# Patient Record
Sex: FEMALE | Race: AMERICAN INDIAN OR ALASKA NATIVE | NOT HISPANIC OR LATINO | Employment: OTHER | URBAN - METROPOLITAN AREA
[De-identification: names, ages, dates, MRNs, and addresses within clinical notes are randomized per-mention and may not be internally consistent; named-entity substitution may affect disease eponyms.]

---

## 2018-07-20 ENCOUNTER — HOSPITAL ENCOUNTER (OUTPATIENT)
Facility: MEDICAL CENTER | Age: 63
End: 2018-07-20
Attending: SURGERY | Admitting: SURGERY
Payer: MEDICARE

## 2018-07-20 ENCOUNTER — APPOINTMENT (OUTPATIENT)
Dept: RADIOLOGY | Facility: MEDICAL CENTER | Age: 63
End: 2018-07-20
Attending: SURGERY
Payer: MEDICARE

## 2018-07-20 VITALS
DIASTOLIC BLOOD PRESSURE: 41 MMHG | BODY MASS INDEX: 37.9 KG/M2 | OXYGEN SATURATION: 100 % | SYSTOLIC BLOOD PRESSURE: 95 MMHG | WEIGHT: 222 LBS | HEART RATE: 66 BPM | TEMPERATURE: 98.2 F | RESPIRATION RATE: 19 BRPM | HEIGHT: 64 IN

## 2018-07-20 DIAGNOSIS — T82.868A THROMBOSIS OF KIDNEY DIALYSIS ARTERIOVENOUS GRAFT, INITIAL ENCOUNTER (HCC): ICD-10-CM

## 2018-07-20 LAB
ANION GAP SERPL CALC-SCNC: 11 MMOL/L (ref 0–11.9)
BUN SERPL-MCNC: 47 MG/DL (ref 8–22)
CALCIUM SERPL-MCNC: 9.7 MG/DL (ref 8.5–10.5)
CHLORIDE SERPL-SCNC: 100 MMOL/L (ref 96–112)
CO2 SERPL-SCNC: 24 MMOL/L (ref 20–33)
CREAT SERPL-MCNC: 8.89 MG/DL (ref 0.5–1.4)
EKG IMPRESSION: NORMAL
ERYTHROCYTE [DISTWIDTH] IN BLOOD BY AUTOMATED COUNT: 48.8 FL (ref 35.9–50)
GLUCOSE BLD-MCNC: 122 MG/DL (ref 65–99)
GLUCOSE SERPL-MCNC: 151 MG/DL (ref 65–99)
HCT VFR BLD AUTO: 43.4 % (ref 37–47)
HGB BLD-MCNC: 13.9 G/DL (ref 12–16)
MCH RBC QN AUTO: 30.3 PG (ref 27–33)
MCHC RBC AUTO-ENTMCNC: 32 G/DL (ref 33.6–35)
MCV RBC AUTO: 94.8 FL (ref 81.4–97.8)
PLATELET # BLD AUTO: 228 K/UL (ref 164–446)
PMV BLD AUTO: 9.9 FL (ref 9–12.9)
POTASSIUM SERPL-SCNC: 5.7 MMOL/L (ref 3.6–5.5)
RBC # BLD AUTO: 4.58 M/UL (ref 4.2–5.4)
SODIUM SERPL-SCNC: 135 MMOL/L (ref 135–145)
WBC # BLD AUTO: 6.9 K/UL (ref 4.8–10.8)

## 2018-07-20 PROCEDURE — 85027 COMPLETE CBC AUTOMATED: CPT

## 2018-07-20 PROCEDURE — 36904 THRMBC/NFS DIALYSIS CIRCUIT: CPT

## 2018-07-20 PROCEDURE — 700101 HCHG RX REV CODE 250

## 2018-07-20 PROCEDURE — 93005 ELECTROCARDIOGRAM TRACING: CPT | Performed by: SURGERY

## 2018-07-20 PROCEDURE — 700111 HCHG RX REV CODE 636 W/ 250 OVERRIDE (IP)

## 2018-07-20 PROCEDURE — 99153 MOD SED SAME PHYS/QHP EA: CPT

## 2018-07-20 PROCEDURE — 93010 ELECTROCARDIOGRAM REPORT: CPT | Performed by: INTERNAL MEDICINE

## 2018-07-20 PROCEDURE — 82962 GLUCOSE BLOOD TEST: CPT

## 2018-07-20 PROCEDURE — 160002 HCHG RECOVERY MINUTES (STAT)

## 2018-07-20 PROCEDURE — 80048 BASIC METABOLIC PNL TOTAL CA: CPT

## 2018-07-20 RX ORDER — MIDAZOLAM HYDROCHLORIDE 1 MG/ML
INJECTION INTRAMUSCULAR; INTRAVENOUS
Status: COMPLETED
Start: 2018-07-20 | End: 2018-07-20

## 2018-07-20 RX ORDER — ONDANSETRON 2 MG/ML
INJECTION INTRAMUSCULAR; INTRAVENOUS
Status: COMPLETED
Start: 2018-07-20 | End: 2018-07-20

## 2018-07-20 RX ORDER — IODIXANOL 270 MG/ML
100 INJECTION, SOLUTION INTRAVASCULAR ONCE
Status: COMPLETED | OUTPATIENT
Start: 2018-07-20 | End: 2018-07-20

## 2018-07-20 RX ORDER — ONDANSETRON 2 MG/ML
4 INJECTION INTRAMUSCULAR; INTRAVENOUS PRN
Status: DISCONTINUED | OUTPATIENT
Start: 2018-07-20 | End: 2018-07-20 | Stop reason: HOSPADM

## 2018-07-20 RX ORDER — SODIUM CHLORIDE 9 MG/ML
500 INJECTION, SOLUTION INTRAVENOUS
Status: DISCONTINUED | OUTPATIENT
Start: 2018-07-20 | End: 2018-07-20 | Stop reason: HOSPADM

## 2018-07-20 RX ORDER — ALBUTEROL SULFATE 90 UG/1
2 AEROSOL, METERED RESPIRATORY (INHALATION) EVERY 6 HOURS PRN
COMMUNITY

## 2018-07-20 RX ORDER — LIDOCAINE HYDROCHLORIDE 10 MG/ML
0.5 INJECTION, SOLUTION INFILTRATION; PERINEURAL
Status: DISCONTINUED | OUTPATIENT
Start: 2018-07-20 | End: 2018-07-21 | Stop reason: HOSPADM

## 2018-07-20 RX ORDER — SEVELAMER CARBONATE 800 MG/1
2400 TABLET, FILM COATED ORAL
COMMUNITY

## 2018-07-20 RX ORDER — SODIUM CHLORIDE 9 MG/ML
INJECTION, SOLUTION INTRAVENOUS CONTINUOUS
Status: DISCONTINUED | OUTPATIENT
Start: 2018-07-20 | End: 2018-07-21 | Stop reason: HOSPADM

## 2018-07-20 RX ORDER — MIDAZOLAM HYDROCHLORIDE 1 MG/ML
.5-2 INJECTION INTRAMUSCULAR; INTRAVENOUS PRN
Status: DISCONTINUED | OUTPATIENT
Start: 2018-07-20 | End: 2018-07-20 | Stop reason: HOSPADM

## 2018-07-20 RX ORDER — LEVOTHYROXINE SODIUM 0.05 MG/1
50 TABLET ORAL
COMMUNITY

## 2018-07-20 RX ORDER — LIDOCAINE HYDROCHLORIDE 10 MG/ML
INJECTION, SOLUTION INFILTRATION; PERINEURAL
Status: COMPLETED
Start: 2018-07-20 | End: 2018-07-20

## 2018-07-20 RX ADMIN — IODIXANOL 47 ML: 270 INJECTION, SOLUTION INTRAVASCULAR at 20:20

## 2018-07-20 RX ADMIN — MIDAZOLAM HYDROCHLORIDE 2 MG: 1 INJECTION INTRAMUSCULAR; INTRAVENOUS at 20:13

## 2018-07-20 RX ADMIN — FENTANYL CITRATE 50 MCG: 50 INJECTION, SOLUTION INTRAMUSCULAR; INTRAVENOUS at 19:50

## 2018-07-20 RX ADMIN — SODIUM CHLORIDE: 9 INJECTION, SOLUTION INTRAVENOUS at 13:58

## 2018-07-20 RX ADMIN — LIDOCAINE HYDROCHLORIDE 0.5 ML: 10 INJECTION, SOLUTION INFILTRATION; PERINEURAL at 13:58

## 2018-07-20 RX ADMIN — MIDAZOLAM 2 MG: 1 INJECTION INTRAMUSCULAR; INTRAVENOUS at 19:50

## 2018-07-20 RX ADMIN — ONDANSETRON 4 MG: 2 INJECTION INTRAMUSCULAR; INTRAVENOUS at 20:11

## 2018-07-20 RX ADMIN — MIDAZOLAM HYDROCHLORIDE 2 MG: 1 INJECTION INTRAMUSCULAR; INTRAVENOUS at 19:50

## 2018-07-20 ASSESSMENT — PAIN SCALES - GENERAL
PAINLEVEL_OUTOF10: 0

## 2018-07-20 NOTE — PROGRESS NOTES
Med rec partially complete per pt at bedside  Interviewed pt with family at bedside with permission from pt  Allergies reviewed and updated.    Waiting for med list to be faxed from [facility]

## 2018-07-21 NOTE — H&P
History and Physical    Date: 7/20/2018    CC: Fistula dysfunction    HPI: This is a 62 y.o. female who is presenting with problems with her arteriovenous fistula. The fistula looks to be a brachial basilic fistula and non-invasive studies demonstrate thrombus within the fistula.  She presents for the first time in a couple years for intervention.     Past Medical History:   Diagnosis Date   • Breath shortness    • Bronchitis    • Chronic renal failure 2002    dialysis   • Diabetes    • Hypertension    • Indigestion    • Kidney dialysis    • Psychiatric problem     depression   • Sleep apnea    • Snoring        Past Surgical History:   Procedure Laterality Date   • RECOVERY  3/15/2012    Performed by SURGERY, IR-RECOVERY at SURGERY SAME DAY [facility] ORS   • AV FISTULA CREATION  1/4/2012    Performed by LEIGHTON YAÑEZ at SURGERY [facility] ORS   • CATH PLACEMENT  1/2/2012    Performed by JACKIE CAM at SURGERY [facility] ORS   • RECOVERY  12/21/2011    Performed by SURGERY, IR-RECOVERY at SURGERY SAME DAY [facility] ORS       Current Facility-Administered Medications   Medication Dose Route Frequency Provider Last Rate Last Dose   • lidocaine (XYLOCAINE) 1%  injection  0.5 mL Intradermal Once PRN Shirley Rivera M.D.   0.5 mL at 07/20/18 1358   • NS infusion   Intravenous Continuous Shirley Rivera M.D. 10 mL/hr at 07/20/18 1358          Social History     Social History   • Marital status:      Spouse name: N/A   • Number of children: N/A   • Years of education: N/A     Occupational History   • Not on file.     Social History Main Topics   • Smoking status: Former Smoker     Types: Cigarettes     Quit date: 12/22/1999   • Smokeless tobacco: Never Used   • Alcohol use No   • Drug use: No   • Sexual activity: Not on file     Other Topics Concern   • Not on file     Social History Narrative   • No narrative on file       Family History:  Diabetes and Renal failure    Allergies:  Amoxicillin; Pcn  [penicillins]; Benadryl [anti-itch]; Codeine; Latex; and Other misc    Review of Systems:  Negative except for arm pain    Physical Exam   Constitutional: She is oriented to person, place, and time and well-developed, well-nourished, and in no distress.   Morbidly obese   HENT:   Head: Normocephalic.   Large mole on the left cheek   Eyes: Pupils are equal, round, and reactive to light.   Neck: Normal range of motion.   Cardiovascular: Normal rate and regular rhythm.    Patent fistula in the left arm   Pulmonary/Chest: Effort normal and breath sounds normal.   Abdominal: Soft.   obese   Musculoskeletal: Normal range of motion. She exhibits edema.   Neurological: She is alert and oriented to person, place, and time.   Skin: Skin is warm and dry.   Psychiatric: Affect normal.     Physical Exam   Constitutional: She is oriented to person, place, and time and well-developed, well-nourished, and in no distress.   Morbidly obese   HENT:   Head: Normocephalic.   Large mole on the left cheek   Eyes: Pupils are equal, round, and reactive to light.   Neck: Normal range of motion.   Cardiovascular: Normal rate and regular rhythm.    Patent fistula in the left arm   Pulmonary/Chest: Effort normal and breath sounds normal.   Abdominal: Soft.   obese   Musculoskeletal: Normal range of motion. She exhibits edema.   Neurological: She is alert and oriented to person, place, and time.   Skin: Skin is warm and dry.   Psychiatric: Affect normal.     Vital Signs  Blood Pressure: (!) 177/70   Temperature: 35.9 °C (96.7 °F)   Pulse: 72   Respiration: 14   Pulse Oximetry: 98 %        Labs:  Recent Labs      07/20/18   1413   WBC  6.9   RBC  4.58   HEMOGLOBIN  13.9   HEMATOCRIT  43.4   MCV  94.8   MCH  30.3   MCHC  32.0*   RDW  48.8   PLATELETCT  228   MPV  9.9     Recent Labs      07/20/18   1413   SODIUM  135   POTASSIUM  5.7*   CHLORIDE  100   CO2  24   GLUCOSE  151*   BUN  47*   CREATININE  8.89*   CALCIUM  9.7       Assessment and  Plan:This is a 62 y.o. Woman with a fistula that seems to have thrombus from a bad access.  I plan a fistulogram with angioplasty.

## 2018-07-21 NOTE — OP REPORT
DATE OF SERVICE:  07/20/2018    PREOPERATIVE DIAGNOSIS:  Left arm fistula dysfunction.    POSTPROCEDURE DIAGNOSIS:  Left arm fistula dysfunction.    PROCEDURE:  Left arm fistulogram with angioplasty.    SURGEON:  Shirley Rivera MD    ANESTHESIA:  Conscious sedation.    ANESTHESIOLOGIST:  Curtis Jefferson RN.    INDICATIONS:  The patient is a 63-year-old woman who presented to the office   today with the fistula dysfunction.  Noninvasive studies showed thrombus in   the mid portion of the basilic transposition fistula.  She is taken to the   angio suite today for intervention to treat the dysfunctional fistula.  Risks   and benefits including bleeding, infection, thrombus formation were all   explained to her and she agrees to proceed.    DESCRIPTION OF PROCEDURE:  Patient was taken to the angio suite, placed in   supine position.  After adequate sedation, the left arm was prepped and draped   in the usual sterile fashion with Chloraseptic prep, cloth towels and paper   drapes.  Access to the fistula was obtained with the micropuncture needle and   this was exchanged over an 0.035 wire for a 6-Montserratian sheath.  I injected   through the sheath and demonstrated no significant thrombus formation.  I   palpated the fistula itself and did not definitely feel any thrombus despite   what I saw on ultrasound prior to beginning the fistula.  Glidewire was   advanced and an outflow fistula obtained.  There was relative obstruction in   the subclavian vein and I passed a guide wire through, but could not get the   guide wire to pass into the left atrium, but merely, into the upper right IJ.    Over the Glidewire, I passed a 12 mm balloon and performed angioplasty of the   subclavian vein to hopefully macerate any clot that might have left the   fistula itself.  This appeared to resolve any relative outflow obstruction,   but using an angled glide catheter, I could never gain access to the left   atrium.  What appeared to be  well-developed collaterals from the left   innominate vein, eventually entered the pulmonary circulation on the right.    Despite guidewire manipulation, I was never able to gain access there.  I   think she has a relative outflow obstruction, and if problems persist, we may   need to move her access.  The sheath was removed and pressure held with a   quick hemostasis.  There were no apparent complications.  She never   experienced shortness of breath or chest pain, but did have mild nausea during   the procedure, which resolved with Zofran.    She received 6.5 minutes of fluoroscopy, had a fluoro a dose of 289.42 mGy,   and received 48 mL of Visipaque contrast.       ____________________________________     MD TAM Davies / LEONEL    DD:  07/20/2018 20:37:03  DT:  07/20/2018 21:02:34    D#:  [id number]  Job#:  [id number]

## 2018-07-21 NOTE — OR SURGEON
Immediate Post OP Note    PreOp Diagnosis: Left arm fistula dysfunction    PostOp Diagnosis: Same    Procedure(s):LEFT ARM FISTULOGRAM WITH INTERVENTION, Subclavian vein angioplasty    Surgeon(s):KISHAN Rivera MD    Anesthesiologist/Type of Anesthesia:Conscious sedation; Narinder Jefferson RN    Estimated Blood Loss: minimal    Findings: no inline outflow to the left atrium?  Emboli from the fistula versus chronic    Complications: none    [id number]    7/20/2018 8:37 PM Shirley Rivera M.D.

## 2018-07-21 NOTE — OR NURSING
Pt received from IR escorted by Chavo Weathers and IR person.  Report received monitors on poc reviewed for pt to discharge to home.    Pt awake vs wnl. No pain or nausea. incontinent of stool, escorted to br. Assist with hygine. Pt visulally impaired and I remained with pt. Web shorts and pads on.     IV 22g r hand d/c's cath intact bandage on.     Assisted pt to dress. Instructions reviewed with pt and . V/u and agreement.    Escorted to entrance via w/c and assisted to car.

## 2018-07-21 NOTE — PROGRESS NOTES
IR RN note:    Site Marked and Confirmed with MD, patient and RN pre procedure   LEFT ARM FISTULOGRAM WITH INTERVENTION, THROMBECTOMY POSSIBLE ANGIOJET by MD Rivera assisted by RT Kimmy,Left arm fistula access site;  Angioplasty Left arm fistula   Dressing and pressure applied by RT   End Tidal CO2 range 30-38 during procedure   Patient tolerated procedure, hemodynamically stable; pt awake and talking post procedure; report given to [facility] MAKAYLA Bradley ;   patient transported back to [facility] via IR RN monitored then transferred care to report RN

## 2018-07-21 NOTE — DISCHARGE INSTRUCTIONS
ACTIVITY: Rest and take it easy for the first 24 hours.  A responsible adult is recommended to remain with you during that time.  It is normal to feel sleepy.  We encourage you to not do anything that requires balance, judgment or coordination.    MILD FLU-LIKE SYMPTOMS ARE NORMAL. YOU MAY EXPERIENCE GENERALIZED MUSCLE ACHES, THROAT IRRITATION, HEADACHE AND/OR SOME NAUSEA.    FOR 24 HOURS DO NOT:  Drive, operate machinery or run household appliances.  Drink beer or alcoholic beverages.   Make important decisions or sign legal documents.    SPECIAL INSTRUCTIONS: as directed by surgeon     DIET: To avoid nausea, slowly advance diet as tolerated, avoiding spicy or greasy foods for the first day.  Add more substantial food to your diet according to your physician's instructions.  Babies can be fed formula or breast milk as soon as they are hungry.  INCREASE FLUIDS AND FIBER TO AVOID CONSTIPATION.    SURGICAL DRESSING/BATHING: keep dsg clean and dry, OK to use fistula for dialysis      FOLLOW-UP APPOINTMENT:  A follow-up appointment should be arranged with your doctor in as needed, and with any questions or concerns; call to schedule. Call Dr. Rivera with questions regarding fistula.  Dr. Rivera's Phone # [phone]  You should CALL YOUR PHYSICIAN if you develop:  Fever greater than 101 degrees F.  Pain not relieved by medication, or persistent nausea or vomiting.  Excessive bleeding (blood soaking through dressing) or unexpected drainage from the wound.  Extreme redness or swelling around the incision site, drainage of pus or foul smelling drainage.  Inability to urinate or empty your bladder within 8 hours.  Problems with breathing or chest pain.    You should call 911 if you develop problems with breathing or chest pain.  If you are unable to contact your doctor or surgical center, you should go to the nearest emergency room or urgent care center.  Physician's telephone #: [phone]  If any questions arise,  call your doctor.  If your doctor is not available, please feel free to call the Surgical Center at {Surgical Dept Numbers:[phone].  The Center is open Monday through Friday from 7AM to 7PM.  You can also call the HEALTH HOTLINE open 24 hours/day, 7 days/week and speak to a nurse at [phone], or toll free at [phone].    A registered nurse may call you a few days after your surgery to see how you are doing after your procedure.    MEDICATIONS: Resume taking daily medication.  Take prescribed pain medication with food.  If no medication is prescribed, you may take non-aspirin pain medication if needed.  PAIN MEDICATION CAN BE VERY CONSTIPATING.  Take a stool softener or laxative such as senokot, pericolace, or milk of magnesia if needed.    Prescription given for NA.  Last pain medication given at NA.    If your physician has prescribed pain medication that includes Acetaminophen (Tylenol), do not take additional Acetaminophen (Tylenol) while taking the prescribed medication.    Depression / Suicide Risk    As you are discharged from this Psychiatric hospital facility, it is important to learn how to keep safe from harming yourself.    Recognize the warning signs:  · Abrupt changes in personality, positive or negative- including increase in energy   · Giving away possessions  · Change in eating patterns- significant weight changes-  positive or negative  · Change in sleeping patterns- unable to sleep or sleeping all the time   · Unwillingness or inability to communicate  · Depression  · Unusual sadness, discouragement and loneliness  · Talk of wanting to die  · Neglect of personal appearance   · Rebelliousness- reckless behavior  · Withdrawal from people/activities they love  · Confusion- inability to concentrate     If you or a loved one observes any of these behaviors or has concerns about self-harm, here's what you can do:  · Talk about it- your feelings and reasons for harming yourself  · Remove any  means that you might use to hurt yourself (examples: pills, rope, extension cords, firearm)  · Get professional help from the community (Mental Health, Substance Abuse, psychological counseling)  · Do not be alone:Call your Safe Contact- someone whom you trust who will be there for you.  · Call your local CRISIS HOTLINE 368-0111 or 091-096-2813  · Call your local Children's Mobile Crisis Response Team [place] (815) 824-5764 or www.PawSpot  · Call the toll free National Suicide Prevention Hotlines   · National Suicide Prevention Lifeline 500-819-OJSD (3603)  · National Hope Line Network 800-SUICIDE (313-9370)